# Patient Record
Sex: MALE | Race: WHITE
[De-identification: names, ages, dates, MRNs, and addresses within clinical notes are randomized per-mention and may not be internally consistent; named-entity substitution may affect disease eponyms.]

---

## 2019-04-17 ENCOUNTER — HOSPITAL ENCOUNTER (OUTPATIENT)
Dept: HOSPITAL 47 - LABWHC1 | Age: 64
Discharge: HOME | End: 2019-04-17
Attending: INTERNAL MEDICINE
Payer: COMMERCIAL

## 2019-04-17 DIAGNOSIS — E11.9: ICD-10-CM

## 2019-04-17 DIAGNOSIS — I49.9: ICD-10-CM

## 2019-04-17 DIAGNOSIS — T86.819: Primary | ICD-10-CM

## 2019-04-17 DIAGNOSIS — J84.112: ICD-10-CM

## 2019-04-17 DIAGNOSIS — Z48.24: ICD-10-CM

## 2019-04-17 LAB
ALBUMIN SERPL-MCNC: 4.2 G/DL (ref 3.8–4.9)
ALBUMIN/GLOB SERPL: 1.75 G/DL (ref 1.6–3.17)
ALP SERPL-CCNC: 212 U/L (ref 41–126)
ALT SERPL-CCNC: 24 U/L (ref 10–49)
ANION GAP SERPL CALC-SCNC: 8.2 MMOL/L (ref 4–12)
AST SERPL-CCNC: 36 U/L (ref 14–35)
BASOPHILS # BLD AUTO: 0.1 K/UL (ref 0–0.2)
BASOPHILS NFR BLD AUTO: 1 %
BILIRUB INDIRECT SERPL-MCNC: 0.7 MG/DL
BUN SERPL-SCNC: 27 MG/DL (ref 9–27)
CALCIUM SPEC-MCNC: 9.3 MG/DL (ref 8.7–10.3)
CHLORIDE SERPL-SCNC: 106 MMOL/L (ref 96–109)
CO2 SERPL-SCNC: 26.8 MMOL/L (ref 21.6–31.8)
EOSINOPHIL # BLD AUTO: 0.2 K/UL (ref 0–0.7)
EOSINOPHIL NFR BLD AUTO: 4 %
ERYTHROCYTE [DISTWIDTH] IN BLOOD BY AUTOMATED COUNT: 3.94 M/UL (ref 4.3–5.9)
ERYTHROCYTE [DISTWIDTH] IN BLOOD: 15.4 % (ref 11.5–15.5)
GLOBULIN SER CALC-MCNC: 2.4 G/DL (ref 1.6–3.3)
GLUCOSE SERPL-MCNC: 127 MG/DL (ref 70–110)
HCT VFR BLD AUTO: 39.6 % (ref 39–53)
HGB BLD-MCNC: 12.7 GM/DL (ref 13–17.5)
LYMPHOCYTES # SPEC AUTO: 1.3 K/UL (ref 1–4.8)
LYMPHOCYTES NFR SPEC AUTO: 21 %
MAGNESIUM SPEC-SCNC: 1.6 MG/DL (ref 1.5–2.4)
MCH RBC QN AUTO: 32.2 PG (ref 25–35)
MCHC RBC AUTO-ENTMCNC: 32 G/DL (ref 31–37)
MCV RBC AUTO: 100.6 FL (ref 80–100)
MONOCYTES # BLD AUTO: 0.4 K/UL (ref 0–1)
MONOCYTES NFR BLD AUTO: 7 %
NEUTROPHILS # BLD AUTO: 3.7 K/UL (ref 1.3–7.7)
NEUTROPHILS NFR BLD AUTO: 64 %
PLATELET # BLD AUTO: 179 K/UL (ref 150–450)
POTASSIUM SERPL-SCNC: 4.7 MMOL/L (ref 3.5–5.5)
PROT SERPL-MCNC: 6.6 G/DL (ref 6.2–8.2)
SODIUM SERPL-SCNC: 141 MMOL/L (ref 135–145)
WBC # BLD AUTO: 5.9 K/UL (ref 3.8–10.6)

## 2019-04-17 PROCEDURE — 36415 COLL VENOUS BLD VENIPUNCTURE: CPT

## 2019-04-17 PROCEDURE — 80197 ASSAY OF TACROLIMUS: CPT

## 2019-04-17 PROCEDURE — 80053 COMPREHEN METABOLIC PANEL: CPT

## 2019-04-17 PROCEDURE — 85025 COMPLETE CBC W/AUTO DIFF WBC: CPT

## 2019-04-17 PROCEDURE — 83735 ASSAY OF MAGNESIUM: CPT

## 2019-04-17 PROCEDURE — 82248 BILIRUBIN DIRECT: CPT

## 2019-04-19 LAB
CMV DNA SPEC NAA+PROBE-LOG#: <1.7 {LOG_COPIES}/ML (ref ?–1.7)
CMV DNA SPEC NAA+PROBE-LOG#: <126 COPIES/ML (ref ?–126)
CMV DNA SPEC PROBE+SIG AMP-ACNC: <50 IU/ML (ref ?–50)

## 2021-04-08 ENCOUNTER — HOSPITAL ENCOUNTER (OUTPATIENT)
Dept: HOSPITAL 47 - OR | Age: 66
Discharge: HOME | End: 2021-04-08
Attending: OTOLARYNGOLOGY
Payer: COMMERCIAL

## 2021-04-08 VITALS — TEMPERATURE: 97 F | RESPIRATION RATE: 16 BRPM

## 2021-04-08 VITALS — SYSTOLIC BLOOD PRESSURE: 171 MMHG | HEART RATE: 71 BPM | DIASTOLIC BLOOD PRESSURE: 86 MMHG

## 2021-04-08 VITALS — BODY MASS INDEX: 28.5 KG/M2

## 2021-04-08 DIAGNOSIS — E11.9: ICD-10-CM

## 2021-04-08 DIAGNOSIS — D04.62: ICD-10-CM

## 2021-04-08 DIAGNOSIS — Z79.899: ICD-10-CM

## 2021-04-08 DIAGNOSIS — Z85.828: ICD-10-CM

## 2021-04-08 DIAGNOSIS — Z79.52: ICD-10-CM

## 2021-04-08 DIAGNOSIS — D04.22: ICD-10-CM

## 2021-04-08 DIAGNOSIS — Z94.2: ICD-10-CM

## 2021-04-08 DIAGNOSIS — D04.21: ICD-10-CM

## 2021-04-08 DIAGNOSIS — B36.8: ICD-10-CM

## 2021-04-08 DIAGNOSIS — Z85.820: ICD-10-CM
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
## 2021-04-08 DIAGNOSIS — D04.39: Primary | ICD-10-CM

## 2021-04-08 DIAGNOSIS — L57.0: ICD-10-CM

## 2021-04-08 DIAGNOSIS — D04.4: ICD-10-CM

## 2021-04-08 DIAGNOSIS — L57.8: ICD-10-CM

## 2021-04-08 DIAGNOSIS — Z79.4: ICD-10-CM

## 2021-04-08 DIAGNOSIS — Z98.890: ICD-10-CM

## 2021-04-08 LAB
GLUCOSE BLD-MCNC: 169 MG/DL (ref 75–99)
GLUCOSE BLD-MCNC: 70 MG/DL (ref 75–99)
GLUCOSE BLD-MCNC: 76 MG/DL (ref 75–99)
GLUCOSE BLD-MCNC: 98 MG/DL (ref 75–99)

## 2021-04-08 PROCEDURE — 88341 IMHCHEM/IMCYTCHM EA ADD ANTB: CPT

## 2021-04-08 PROCEDURE — 14041 TIS TRNFR F/C/C/M/N/A/G/H/F: CPT

## 2021-04-08 PROCEDURE — 11602 EXC TR-EXT MAL+MARG 1.1-2 CM: CPT

## 2021-04-08 PROCEDURE — 88331 PATH CONSLTJ SURG 1 BLK 1SPC: CPT

## 2021-04-08 PROCEDURE — 13121 CMPLX RPR S/A/L 2.6-7.5 CM: CPT

## 2021-04-08 PROCEDURE — 13122 CMPLX RPR S/A/L ADDL 5 CM/>: CPT

## 2021-04-08 PROCEDURE — 14301 TIS TRNFR ANY 30.1-60 SQ CM: CPT

## 2021-04-08 PROCEDURE — 11606 EXC TR-EXT MAL+MARG >4 CM: CPT

## 2021-04-08 PROCEDURE — 88342 IMHCHEM/IMCYTCHM 1ST ANTB: CPT

## 2021-04-08 PROCEDURE — 88305 TISSUE EXAM BY PATHOLOGIST: CPT

## 2021-04-08 PROCEDURE — 88332 PATH CONSLTJ SURG EA ADD BLK: CPT

## 2021-04-08 NOTE — OP
OPERATIVE REPORT



DATE OF SERVICE:

04/08/2021.



SURGEON:

John Stevens D.O.



PREOPERATIVE DIAGNOSIS:

Multiple malignancies and skin lesions:

1. Right preauricular lesion measures 4.6 x 2 cm.

2. Right anterior scalp lesion measures 4.1 x 2 cm.

3. Left upper forehead lesion measures 6.1 x 1.5 cm.

4. Left lateral forehead lesion measures 4.2 x 2 cm.

5. Left preauricular lesion measures 4.8 x 2 cm.

6. Left upper forearm lesion measures 1.6 x 1 cm.

7. Left upper mid forearm lesion measures 4.5 x 2 cm.

8. Left lower mid forearm lesion measures 2.6 x 2 cm.

9. Left lower forearm lesion measures 6.1 x 3 cm.



POSTOPERATIVE DIAGNOSIS:

Multiple malignancies and skin lesions:

1. Right preauricular lesion measures 4.6 x 2 cm.

2. Right anterior scalp lesion measures 4.1 x 2 cm.

3. Left upper forehead lesion measures 6.1 x 1.5 cm.

4. Left lateral forehead lesion measures 4.2 x 2 cm.

5. Left preauricular lesion measures 4.8 x 2 cm.

6. Left upper forearm lesion measures 1.6 x 1 cm.

7. Left upper mid forearm lesion measures 4.5 x 2 cm.

8. Left lower mid forearm lesion measures 2.6 x 2 cm.

9. Left lower forearm lesion measures 6.1 x 3 cm.



OPERATIVE PROCEDURE:

1. Excision of a right preauricular skin lesion measuring 4.6 x 2 cm with

    reconstruction utilizing a bilateral advancement flap closure with a secondary

    defect measuring 9.2 x 4 cm.

2. Excision of a right anterior scalp lesion measuring 4.1 x 2 cm with reconstruction

    utilizing a bilateral advancement flap for closure with a secondary defect of 8.2 x

    4 cm.

3. Excision of a left upper forehead lesion measuring 6.1 x 1.5 cm with reconstruction

    utilizing a bilateral advancement flap with a secondary defect measuring 12.2 x 3

    cm.

4. Excision of a left lateral forehead lesion measuring 4.2 x 2 cm with reconstruction

    with a bilateral advancement flap and a secondary defect measuring 8.4 x 2 cm.

5. Excision of a left preauricular lesion measuring 4.8 x 2 cm with reconstruction

    utilizing a bilateral advancement flap closure with a secondary defect measuring

    9.6 x 4 cm.

6. Excision of a left upper forearm lesion measuring 1.6 x 1 cm with complex closure.

7. Excision of a left upper mid forearm lesion measuring 4.5 x 2 cm with complex

    closure.

8. Excision of a left lower mid forearm lesion measuring 2.6 x 2 cm with complex

    closure.

9. Excision of a left lower forearm lesion measuring 6.1 x 3 cm with complex closure.



OPERATIVE BLOOD LOSS:

Minimal.



SPECIMENS REMOVED:

As above.



COMPLICATIONS:

None.



ANESTHESIA:

General.



CONSENT:

All risks, benefits and alternative therapies were discussed.  Consent was obtained.

All questions were answered.



OPERATIVE INDICATIONS:

This patient was found to have multiple lesions.  He had a cancer of the left upper

forehead.  We did a wide excision, but the margins were still positive for tumor.  He

had multiple other skin cancers and lesions that he wished to have removed.



OPERATIVE FINDINGS:

All frozen sections came back with clean margins.



OPERATIVE PROCEDURE:

This patient was taken to the operating room and placed in supine position.  LMA was

inserted and the patient was monitored throughout the entire case by the department of

anesthesia with a functioning IV line in place.  The patient had good vitals during

this time.  The face and left arm were sterilely prepped and draped in the usual

fashion.  The multiple areas were marked and anesthetized with lidocaine 1% with

epinephrine 1:100,000.  Approximately 10 minutes were allowed to wait for full

vasoconstrictive effects to take place.  At this time attention was then first paid to

the head and face region.  He had a large exophytic fungating lesion in the right

preauricular area that measured 4.6 x 2 cm. We excised that with a 15-blade.  Delicate

plastic excision was then broadened with Adson forceps and developed bilateral

advancement flap closures anteriorly and posteriorly.  We removed Burow's triangles and

did extensive undermining and we rotated the skin to close the defect.  We closed the

deep dermal layer with 4-0 Monocryl.  We closed the mid dermal layer with 4-0 Monocryl

and 3-0 PDS and the final skin layer was with the use of a 5-0 Prolene in a running non-

locking fashion.  Steri-Strips were applied.  Attention was paid to the right anterior

scalp lesion measuring 4.1 x 2 cm.  This was also excised with a 15-blade.  Extensive

undermining and bilateral advancement flaps were developed superiorly and inferiorly.

We removed Burow's triangles and prepped the skin edges.  We had a secondary defect

measuring 8.2 x 4 cm.  We closed that with 3-0 PDS, 4-0 Monocryl and a 5-0 Prolene.

Attention was then paid to the left upper forehead lesion which was previously excised

but had positive margins. Our excision was 6.1 x 1.5 cm.  We removed that with a 15-

blade.  Delicate plastic excision was then broadened with Adson forceps. We developed

superior and inferiorly based pedicle flaps, removed Burow's triangles, removed

redundant skin, did extensive undermining, rotated the flaps and closed the defect with

a secondary defect measuring 12.2 x 3 cm.  We then paid attention to the left lateral

forehead lesion, which measured 4.2 x 2 cm.  We excised that with a 15-blade.  Delicate

plastic excision was broadened with Adson forceps and we did extensive undermining in

all directions and closed this with bilateral advancement flaps superiorly and

inferiorly based with removal of Burow's triangles with a secondary defect measuring

8.4 x 2 cm.  We then paid attention to the left preauricular lesion which had an

exophytic fungating mass measuring 4.8 x 2 cm.  We excised that with a 15-blade and

raised medial and lateral flaps for advancement.  We removed Burow's triangles.  We did

extensive undermining in all directions.  We trimmed the skin edge.  We had a secondary

defect measuring 9.6 x 4 cm but closed that with 3-0 PDS, 4-0 Monocryl and the 5-0

Prolene.  The patient then had 4 lesions on the left forearm.  The upper forearm lesion

measured 1.6 x 1 cm.  The left upper mid forearm lesion measured 4.5 x 2 cm.  The left

lower mid forearm lesion measured 2.6 x 2 cm and the left lower forearm lesion measured

6.1 x 3 cm.  We excised all these 4 lesions with the delicate plastics scissors,

broadened with Adson forceps. We then closed the deep dermal layers with 3-0 PDS, 4-0

Monocryl. We did of course extensive undermining.  We removed redundant tissue, prepped

the skin edges and closed them in a complex fashion.  Final skin closure was with rapid

Vicryl utilizing a 5-0 Rapide Vicryl.  Excellent approximation was obtained. We applied

Steri-Strips to all these incisions.  The patient tolerated this well, and followup

will be in the office in one week for recheck.  The patient is to contact me if any

problems should arise in the interim.





STEWART / LUIS MN: [id number] / Job#: [id number]